# Patient Record
Sex: FEMALE | Race: WHITE | Employment: STUDENT | URBAN - METROPOLITAN AREA
[De-identification: names, ages, dates, MRNs, and addresses within clinical notes are randomized per-mention and may not be internally consistent; named-entity substitution may affect disease eponyms.]

---

## 2017-07-21 PROBLEM — K85.90 ACUTE PANCREATITIS WITHOUT INFECTION OR NECROSIS, UNSPECIFIED PANCREATITIS TYPE: Status: ACTIVE | Noted: 2017-07-21

## 2017-08-10 PROCEDURE — 87338 HPYLORI STOOL AG IA: CPT | Performed by: SPECIALIST

## 2019-04-23 ENCOUNTER — HOSPITAL ENCOUNTER (OUTPATIENT)
Age: 24
Discharge: HOME | End: 2019-04-23
Attending: FAMILY MEDICINE
Payer: COMMERCIAL

## 2019-04-23 VITALS
SYSTOLIC BLOOD PRESSURE: 125 MMHG | WEIGHT: 115 LBS | TEMPERATURE: 98 F | BODY MASS INDEX: 17.03 KG/M2 | DIASTOLIC BLOOD PRESSURE: 71 MMHG | OXYGEN SATURATION: 100 % | RESPIRATION RATE: 18 BRPM | HEART RATE: 75 BPM | HEIGHT: 69 IN

## 2019-04-23 DIAGNOSIS — Z20.2 STD EXPOSURE: Primary | ICD-10-CM

## 2019-04-23 PROCEDURE — 99204 OFFICE O/P NEW MOD 45 MIN: CPT

## 2019-04-23 PROCEDURE — 87491 CHLMYD TRACH DNA AMP PROBE: CPT | Performed by: FAMILY MEDICINE

## 2019-04-23 PROCEDURE — 87591 N.GONORRHOEAE DNA AMP PROB: CPT | Performed by: FAMILY MEDICINE

## 2019-04-23 PROCEDURE — 99202 OFFICE O/P NEW SF 15 MIN: CPT

## 2019-04-23 NOTE — ED PROVIDER NOTES
Pt seen in [facility] Immediate Care In [facility]      Stated Complaint: Patient presents with:  Eval-G (genital)      --------------   RN assessment:     Std exposure  --------------    CC: std exp    HPI:  Sergo Zamora is a 25year old fem Not on file        Relationship status: Not on file      Intimate partner violence:        Fear of current or ex partner: Not on file        Emotionally abused: Not on file        Physically abused: Not on file        Forced sexual activity: Not on file sounds active all four quadrants,     no masses, no organomegaly   Extremities:   Extremities normal, atraumatic, no cyanosis or edema   Pulses:   2+ and symmetric all extremities   Neurologic:   CNII-XII intact, normal strength, sensation and reflexes

## 2019-07-19 ENCOUNTER — TRANSFERRED RECORDS (OUTPATIENT)
Dept: HEALTH INFORMATION MANAGEMENT | Facility: CLINIC | Age: 24
End: 2019-07-19

## 2019-09-18 ENCOUNTER — HOSPITAL ENCOUNTER (OUTPATIENT)
Dept: MRI IMAGING | Facility: CLINIC | Age: 24
Discharge: HOME | End: 2019-09-18
Attending: INTERNAL MEDICINE | Admitting: INTERNAL MEDICINE
Payer: COMMERCIAL

## 2019-09-18 ENCOUNTER — TRANSFERRED RECORDS (OUTPATIENT)
Dept: HEALTH INFORMATION MANAGEMENT | Facility: CLINIC | Age: 24
End: 2019-09-18

## 2019-09-18 DIAGNOSIS — R11.0 NAUSEA: ICD-10-CM

## 2019-09-18 DIAGNOSIS — R10.12 LEFT UPPER QUADRANT PAIN: ICD-10-CM

## 2019-09-18 DIAGNOSIS — K31.84 GASTROPARESIS: ICD-10-CM

## 2019-09-18 PROCEDURE — 25000128 H RX IP 250 OP 636: Performed by: STUDENT IN AN ORGANIZED HEALTH CARE EDUCATION/TRAINING PROGRAM

## 2019-09-18 PROCEDURE — 72197 MRI PELVIS W/O & W/DYE: CPT

## 2019-09-18 PROCEDURE — A9585 GADOBUTROL INJECTION: HCPCS | Performed by: STUDENT IN AN ORGANIZED HEALTH CARE EDUCATION/TRAINING PROGRAM

## 2019-09-18 PROCEDURE — 25500064 ZZH RX 255 OP 636: Performed by: STUDENT IN AN ORGANIZED HEALTH CARE EDUCATION/TRAINING PROGRAM

## 2019-09-18 RX ORDER — GADOBUTROL 604.72 MG/ML
0.1 INJECTION INTRAVENOUS ONCE
Status: COMPLETED | OUTPATIENT
Start: 2019-09-18 | End: 2019-09-18

## 2019-09-18 RX ADMIN — GLUCAGON HYDROCHLORIDE 1 MG: 1 INJECTION, POWDER, FOR SOLUTION INTRAMUSCULAR; INTRAVENOUS; SUBCUTANEOUS at 13:37

## 2019-09-18 RX ADMIN — GADOBUTROL 7 ML: 604.72 INJECTION INTRAVENOUS at 14:05

## 2019-10-22 ENCOUNTER — MEDICAL CORRESPONDENCE (OUTPATIENT)
Dept: HEALTH INFORMATION MANAGEMENT | Facility: CLINIC | Age: 24
End: 2019-10-22

## 2019-10-22 ENCOUNTER — TRANSFERRED RECORDS (OUTPATIENT)
Dept: HEALTH INFORMATION MANAGEMENT | Facility: CLINIC | Age: 24
End: 2019-10-22

## 2019-10-22 ENCOUNTER — RECORDS - HEALTHEAST (OUTPATIENT)
Dept: ADMINISTRATIVE | Facility: OTHER | Age: 24
End: 2019-10-22

## 2019-11-06 NOTE — TELEPHONE ENCOUNTER
RECORDS RECEIVED FROM: Internal    DATE RECEIVED: 2/12/20   NOTES STATUS DETAILS   OFFICE NOTE from referring provider [facility] OV note 6/29/19   OFFICE NOTE from other specialist Received  [facility] recs scanned in epic    DISCHARGE SUMMARY from hospital N/A    OPERATIVE REPORT N/A    MEDICATION LIST CE Within OV note 6/29/19         ENDOSCOPY  N/A    COLONOSCOPY N/A    ERCP N/A    EUS N/A    STOOL TESTING N/A    PERTINENT LABS Received  Within [facility] notes   PATHOLOGY REPORTS (RELATED) N/A    IMAGING (CT, MRI, EGD) Internal MR Enterography 9/18/19 - IN  PACS      REFERRAL INFORMATION    Date referral was placed: 2/12/20   Date all records received: N/A   Date records were scanned into Epic: N/A   Date records were sent to Provider to review: N/A   Date and recommendation received from provider:  LETTER SENT  SCHEDULE APPOINTMENT   Date patient was contacted to schedule: 11/4/19

## 2019-11-07 ENCOUNTER — AMBULATORY - HEALTHEAST (OUTPATIENT)
Dept: SURGERY | Facility: CLINIC | Age: 24
End: 2019-11-07

## 2019-11-07 DIAGNOSIS — R10.9 ABDOMINAL PAIN: ICD-10-CM

## 2019-11-11 ENCOUNTER — OFFICE VISIT - HEALTHEAST (OUTPATIENT)
Dept: SURGERY | Facility: CLINIC | Age: 24
End: 2019-11-11

## 2019-11-11 DIAGNOSIS — R10.84 ABDOMINAL PAIN, GENERALIZED: ICD-10-CM

## 2019-11-12 ENCOUNTER — COMMUNICATION - HEALTHEAST (OUTPATIENT)
Dept: SURGERY | Facility: CLINIC | Age: 24
End: 2019-11-12

## 2019-11-14 ENCOUNTER — HOSPITAL ENCOUNTER (OUTPATIENT)
Dept: CT IMAGING | Facility: HOSPITAL | Age: 24
Discharge: HOME | End: 2019-11-14
Attending: SPECIALIST

## 2019-11-14 DIAGNOSIS — R10.84 ABDOMINAL PAIN, GENERALIZED: ICD-10-CM

## 2019-11-15 ENCOUNTER — AMBULATORY - HEALTHEAST (OUTPATIENT)
Dept: SURGERY | Facility: CLINIC | Age: 24
End: 2019-11-15

## 2019-11-15 DIAGNOSIS — R10.9 CHRONIC ABDOMINAL PAIN: ICD-10-CM

## 2019-11-15 DIAGNOSIS — G89.29 CHRONIC ABDOMINAL PAIN: ICD-10-CM

## 2019-11-21 ASSESSMENT — MIFFLIN-ST. JEOR: SCORE: 1303.34

## 2019-11-25 ENCOUNTER — SURGERY - HEALTHEAST (OUTPATIENT)
Dept: SURGERY | Facility: HOSPITAL | Age: 24
End: 2019-11-25

## 2019-11-25 ENCOUNTER — ANESTHESIA - HEALTHEAST (OUTPATIENT)
Dept: SURGERY | Facility: HOSPITAL | Age: 24
End: 2019-11-25

## 2020-02-12 ENCOUNTER — PRE VISIT (OUTPATIENT)
Dept: GASTROENTEROLOGY | Facility: CLINIC | Age: 25
End: 2020-02-12

## 2020-03-11 ENCOUNTER — HEALTH MAINTENANCE LETTER (OUTPATIENT)
Age: 25
End: 2020-03-11

## 2020-11-19 ENCOUNTER — TRANSFERRED RECORDS (OUTPATIENT)
Dept: HEALTH INFORMATION MANAGEMENT | Facility: CLINIC | Age: 25
End: 2020-11-19

## 2020-12-07 ENCOUNTER — TRANSFERRED RECORDS (OUTPATIENT)
Dept: HEALTH INFORMATION MANAGEMENT | Facility: CLINIC | Age: 25
End: 2020-12-07

## 2020-12-16 ENCOUNTER — OFFICE VISIT (OUTPATIENT)
Dept: FAMILY MEDICINE | Facility: CLINIC | Age: 25
End: 2020-12-16
Payer: OTHER GOVERNMENT

## 2020-12-16 VITALS
RESPIRATION RATE: 16 BRPM | SYSTOLIC BLOOD PRESSURE: 122 MMHG | HEIGHT: 69 IN | DIASTOLIC BLOOD PRESSURE: 62 MMHG | BODY MASS INDEX: 16.74 KG/M2 | WEIGHT: 113 LBS | OXYGEN SATURATION: 99 % | HEART RATE: 74 BPM | TEMPERATURE: 99.2 F

## 2020-12-16 DIAGNOSIS — F41.1 GENERALIZED ANXIETY DISORDER: Primary | ICD-10-CM

## 2020-12-16 DIAGNOSIS — R10.9 CHRONIC ABDOMINAL PAIN: ICD-10-CM

## 2020-12-16 DIAGNOSIS — G89.29 CHRONIC ABDOMINAL PAIN: ICD-10-CM

## 2020-12-16 PROCEDURE — 99204 OFFICE O/P NEW MOD 45 MIN: CPT | Performed by: INTERNAL MEDICINE

## 2020-12-16 RX ORDER — ONDANSETRON 4 MG/1
4 TABLET, FILM COATED ORAL PRN
COMMUNITY
Start: 2020-07-20

## 2020-12-16 RX ORDER — PAROXETINE 10 MG/1
20 TABLET, FILM COATED ORAL EVERY MORNING
Qty: 30 TABLET | Refills: 2 | Status: SHIPPED | OUTPATIENT
Start: 2020-12-16 | End: 2021-01-19

## 2020-12-16 RX ORDER — LORAZEPAM 0.5 MG/1
0.5 TABLET ORAL DAILY PRN
Qty: 10 TABLET | Refills: 0 | Status: SHIPPED | OUTPATIENT
Start: 2020-12-16

## 2020-12-16 SDOH — HEALTH STABILITY: MENTAL HEALTH: HOW MANY STANDARD DRINKS CONTAINING ALCOHOL DO YOU HAVE ON A TYPICAL DAY?: 1 OR 2

## 2020-12-16 SDOH — HEALTH STABILITY: MENTAL HEALTH: HOW OFTEN DO YOU HAVE 6 OR MORE DRINKS ON ONE OCCASION?: NEVER

## 2020-12-16 SDOH — HEALTH STABILITY: MENTAL HEALTH: HOW OFTEN DO YOU HAVE A DRINK CONTAINING ALCOHOL?: MONTHLY OR LESS

## 2020-12-16 ASSESSMENT — ANXIETY QUESTIONNAIRES
5. BEING SO RESTLESS THAT IT IS HARD TO SIT STILL: MORE THAN HALF THE DAYS
3. WORRYING TOO MUCH ABOUT DIFFERENT THINGS: NEARLY EVERY DAY
GAD7 TOTAL SCORE: 20
2. NOT BEING ABLE TO STOP OR CONTROL WORRYING: NEARLY EVERY DAY
7. FEELING AFRAID AS IF SOMETHING AWFUL MIGHT HAPPEN: NEARLY EVERY DAY
IF YOU CHECKED OFF ANY PROBLEMS ON THIS QUESTIONNAIRE, HOW DIFFICULT HAVE THESE PROBLEMS MADE IT FOR YOU TO DO YOUR WORK, TAKE CARE OF THINGS AT HOME, OR GET ALONG WITH OTHER PEOPLE: VERY DIFFICULT
1. FEELING NERVOUS, ANXIOUS, OR ON EDGE: NEARLY EVERY DAY
6. BECOMING EASILY ANNOYED OR IRRITABLE: NEARLY EVERY DAY

## 2020-12-16 ASSESSMENT — PATIENT HEALTH QUESTIONNAIRE - PHQ9
SUM OF ALL RESPONSES TO PHQ QUESTIONS 1-9: 12
5. POOR APPETITE OR OVEREATING: NEARLY EVERY DAY

## 2020-12-16 ASSESSMENT — MIFFLIN-ST. JEOR: SCORE: 1326.55
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
## 2020-12-16 NOTE — NURSING NOTE
PT declined giving info about [facility] today - she didn't remember the place and told me that she doesn't want the records to come.  [facility] signed today ([facility])  Flu shot declined

## 2020-12-16 NOTE — PATIENT INSTRUCTIONS
1. Will get information [facility]  2. Discuss family planning with GI doctor.  3. Start Paxil (paroxetine) 10 mg daily  4. Use Ativan (lorazepam) as needed for severe anxiety.  Watch for drowsiness

## 2020-12-17 ASSESSMENT — ANXIETY QUESTIONNAIRES: GAD7 TOTAL SCORE: 20

## 2021-01-03 ENCOUNTER — HEALTH MAINTENANCE LETTER (OUTPATIENT)
Age: 26
End: 2021-01-03

## 2021-01-19 ENCOUNTER — OFFICE VISIT (OUTPATIENT)
Dept: FAMILY MEDICINE | Facility: CLINIC | Age: 26
End: 2021-01-19
Payer: OTHER GOVERNMENT

## 2021-01-19 VITALS
BODY MASS INDEX: 16.99 KG/M2 | HEART RATE: 69 BPM | WEIGHT: 116 LBS | SYSTOLIC BLOOD PRESSURE: 116 MMHG | DIASTOLIC BLOOD PRESSURE: 66 MMHG | RESPIRATION RATE: 16 BRPM | OXYGEN SATURATION: 100 % | TEMPERATURE: 97.4 F

## 2021-01-19 DIAGNOSIS — Z12.4 SCREENING FOR MALIGNANT NEOPLASM OF CERVIX: ICD-10-CM

## 2021-01-19 DIAGNOSIS — T88.7XXA MEDICATION SIDE EFFECTS: ICD-10-CM

## 2021-01-19 DIAGNOSIS — Z23 NEED FOR VACCINATION: ICD-10-CM

## 2021-01-19 DIAGNOSIS — F41.1 GENERALIZED ANXIETY DISORDER: Primary | ICD-10-CM

## 2021-01-19 PROCEDURE — G0145 SCR C/V CYTO,THINLAYER,RESCR: HCPCS | Performed by: INTERNAL MEDICINE

## 2021-01-19 PROCEDURE — G0124 SCREEN C/V THIN LAYER BY MD: HCPCS | Performed by: PATHOLOGY

## 2021-01-19 PROCEDURE — 90472 IMMUNIZATION ADMIN EACH ADD: CPT | Performed by: INTERNAL MEDICINE

## 2021-01-19 PROCEDURE — 90715 TDAP VACCINE 7 YRS/> IM: CPT | Performed by: INTERNAL MEDICINE

## 2021-01-19 PROCEDURE — 90651 9VHPV VACCINE 2/3 DOSE IM: CPT | Performed by: INTERNAL MEDICINE

## 2021-01-19 PROCEDURE — 90471 IMMUNIZATION ADMIN: CPT | Performed by: INTERNAL MEDICINE

## 2021-01-19 PROCEDURE — 99214 OFFICE O/P EST MOD 30 MIN: CPT | Mod: 25 | Performed by: INTERNAL MEDICINE

## 2021-01-19 RX ORDER — PAROXETINE 20 MG/1
20 TABLET, FILM COATED ORAL EVERY MORNING
Qty: 90 TABLET | Refills: 3 | Status: SHIPPED | OUTPATIENT
Start: 2021-01-19 | End: 2021-01-27 | Stop reason: SINTOL

## 2021-01-19 ASSESSMENT — ANXIETY QUESTIONNAIRES
1. FEELING NERVOUS, ANXIOUS, OR ON EDGE: SEVERAL DAYS
6. BECOMING EASILY ANNOYED OR IRRITABLE: NOT AT ALL
7. FEELING AFRAID AS IF SOMETHING AWFUL MIGHT HAPPEN: SEVERAL DAYS
IF YOU CHECKED OFF ANY PROBLEMS ON THIS QUESTIONNAIRE, HOW DIFFICULT HAVE THESE PROBLEMS MADE IT FOR YOU TO DO YOUR WORK, TAKE CARE OF THINGS AT HOME, OR GET ALONG WITH OTHER PEOPLE: SOMEWHAT DIFFICULT
2. NOT BEING ABLE TO STOP OR CONTROL WORRYING: SEVERAL DAYS
5. BEING SO RESTLESS THAT IT IS HARD TO SIT STILL: SEVERAL DAYS
3. WORRYING TOO MUCH ABOUT DIFFERENT THINGS: SEVERAL DAYS

## 2021-01-19 ASSESSMENT — PATIENT HEALTH QUESTIONNAIRE - PHQ9: SUM OF ALL RESPONSES TO PHQ QUESTIONS 1-9: 5

## 2021-01-19 NOTE — PROGRESS NOTES
Assessment & Plan     Generalized anxiety disorder - patient opts to continue med. Prefers to manage insomnia with over the counter and non-Rx therapy. Discussed general recommendation to use alternative med in pregnancy . Patient not planning pregnancy yet.  Discussed trialing another med vs OB/GYN consultation prior to pregnancy, patient agreeable.    - PARoxetine (PAXIL) 20 MG tablet; Take 1 tablet (20 mg) by mouth every morning    Medication side effects - insomnia.    Screening for malignant neoplasm of cervix  - Pap imaged thin layer screen only - recommended age 21 - 24 years    Need for vaccination  - TDAP VACCINE (Adacel, Boostrix)  [[id number]]  - HUMAN PAPILLOMA VIRUS (GARDASIL 9) VACCINE [[id number]]                         Patient Instructions   1. We discussed insomnia as likely side effects of the paroxetine.  Can try PM medication such as Benadryl 25-50 mg.  Watch for dry mouth.  Can also try to move the paroxetine to bedtime.  2. Refilled paxil at 20 mg        Sleep Hygiene    1. Avoid doing anything stressful in the hour before bedtime. Avoid paying bills, watching politics on the news, etc.  2. Avoid screens just before bedtime. This includes cell phones, iPad, computers, TV. The bright lights on the screen is very stimulating to the brain, and makes it difficult to follow asleep and stay asleep  3. Avoid alcohol. Alcohol can sometimes make it easier to fall asleep, but significantly reduces the chance that you will stay asleep. It also impairs REM sleep, which is the good restful sleep  4. Use the bed for sleep and sex only. Avoid eating, reading, watching TV in bed  5. If you feel very restless at bedtime, try doing mundane physical activities such as vacuuming, folding laundry, etc.  6. If you find yourself making lists in your head at night, keep pen and paper at the bedside. Write down these lists. Also visualize yourself checking that item off your list and removing it from your brain.  7. Keep  the room cool and dark. Consider investing in late darkening curtains  8. Avoid drinking excessive fluids just before bedtime. Empty your bladder just before bedtime  9. Cognitive behavioral therapy (CBT) has been proven to be highly effective to treat insomnia.  Consider meeting with [facility] [place] for CBT.  There is also a free precious called CBT-I               No follow-ups on file.    Amanda Greenberg,   [facility]    Rajiv Mohan is a 25 year old who presents to clinic today for the following health issues     HPI     Flu shot: Declined  HPV:  Today  Tdap: Today    PAP today      Depression and Anxiety Follow-Up    How are you doing with your depression since your last visit? Improved     How are you doing with your anxiety since your last visit?  Improved     Are you having other symptoms that might be associated with depression or anxiety? No    Have you had a significant life event? No     Do you have any concerns with your use of alcohol or other drugs? No     Last visit on 12/16 we started paxil and ativan.  She feels they are helping significantly.  Appetite is improving, not feeling as stressed about her GI symptoms. Appetite is increased which is good.    side effects is insomnia and feeling restless.  This feels tolerable because so many other symptoms are improved.  Is taking in AM.    Has tried melatonin but made her too groggy and did not last all night    She strongly prefers to continue on paxil and manage insomnia.    Having mild RLS which have occurred even prior to starting paxil    Doing mediation at bedtime.    Has not needed to use the ativan.    Wonders if she can take this if she would get pregnant.    Social History     Tobacco Use     Smoking status: Never Smoker     Smokeless tobacco: Never Used   Substance Use Topics     Alcohol use: Yes     Frequency: Monthly or less     Drinks per session: 1 or 2     Binge  frequency: Never     Comment: Sometimes     Drug use: Never     PHQ 12/16/2020   PHQ-9 Total Score 12   Q9: Thoughts of better off dead/self-harm past 2 weeks Not at all     JERSON-7 SCORE 12/16/2020   Total Score 20     Last PHQ-9 12/16/2020   1.  Little interest or pleasure in doing things 1   2.  Feeling down, depressed, or hopeless 1   3.  Trouble falling or staying asleep, or sleeping too much 2   4.  Feeling tired or having little energy 2   5.  Poor appetite or overeating 2   6.  Feeling bad about yourself 2   7.  Trouble concentrating 2   8.  Moving slowly or restless 0   Q9: Thoughts of better off dead/self-harm past 2 weeks 0   PHQ-9 Total Score 12   Difficulty at work, home, or with people Somewhat difficult     JERSON-7  12/16/2020   1. Feeling nervous, anxious, or on edge 3   2. Not being able to stop or control worrying 3   3. Worrying too much about different things 3   4. Trouble relaxing 3   5. Being so restless that it is hard to sit still 2   6. Becoming easily annoyed or irritable 3   7. Feeling afraid, as if something awful might happen 3   JERSON-7 Total Score 20   If you checked any problems, how difficult have they made it for you to do your work, take care of things at home, or get along with other people? Very difficult       Suicide Assessment Five-step Evaluation and Treatment (SAFE-T)      Review of Systems   Constitutional, HEENT, cardiovascular, pulmonary, gi and gu systems are negative, except as otherwise noted.      Objective    /66   Pulse 69   Temp 97.4  F (36.3  C) (Tympanic)   Resp 16   Wt 52.6 kg (116 lb)   LMP 01/07/2021   SpO2 100%   Breastfeeding No   BMI 16.99 kg/m    Body mass index is 16.99 kg/m .  Physical Exam   GENERAL APPEARANCE: healthy, alert and no distress   (female): normal cervix, adnexae, and uterus without masses or discharge and pap collected  PSYCH: mentation appears normal and affect normal/bright

## 2021-01-19 NOTE — PATIENT INSTRUCTIONS
1. We discussed insomnia as likely side effects of the paroxetine.  Can try PM medication such as Benadryl 25-50 mg.  Watch for dry mouth.  Can also try to move the paroxetine to bedtime.  2. Refilled paxil at 20 mg        Sleep Hygiene    1. Avoid doing anything stressful in the hour before bedtime. Avoid paying bills, watching politics on the news, etc.  2. Avoid screens just before bedtime. This includes cell phones, iPad, computers, TV. The bright lights on the screen is very stimulating to the brain, and makes it difficult to follow asleep and stay asleep  3. Avoid alcohol. Alcohol can sometimes make it easier to fall asleep, but significantly reduces the chance that you will stay asleep. It also impairs REM sleep, which is the good restful sleep  4. Use the bed for sleep and sex only. Avoid eating, reading, watching TV in bed  5. If you feel very restless at bedtime, try doing mundane physical activities such as vacuuming, folding laundry, etc.  6. If you find yourself making lists in your head at night, keep pen and paper at the bedside. Write down these lists. Also visualize yourself checking that item off your list and removing it from your brain.  7. Keep the room cool and dark. Consider investing in late darkening curtains  8. Avoid drinking excessive fluids just before bedtime. Empty your bladder just before bedtime  9. Cognitive behavioral therapy (CBT) has been proven to be highly effective to treat insomnia.  Consider meeting with [facility] [place] for CBT.  There is also a free precious called CBT-I

## 2021-01-19 NOTE — NURSING NOTE
Prior to immunization administration, verified patients identity using patient s name and date of birth. Please see Immunization Activity for additional information.     Screening Questionnaire for Adult Immunization    Are you sick today?   No   Do you have allergies to medications, food, a vaccine component or latex?   No   Have you ever had a serious reaction after receiving a vaccination?   No   Do you have a long-term health problem with heart, lung, kidney, or metabolic disease (e.g., diabetes), asthma, a blood disorder, no spleen, complement component deficiency, a cochlear implant, or a spinal fluid leak?  Are you on long-term aspirin therapy?   No   Do you have cancer, leukemia, HIV/AIDS, or any other immune system problem?   No   Do you have a parent, brother, or sister with an immune system problem?   No   In the past 3 months, have you taken medications that affect  your immune system, such as prednisone, other steroids, or anticancer drugs; drugs for the treatment of rheumatoid arthritis, Crohn s disease, or psoriasis; or have you had radiation treatments?   No   Have you had a seizure, or a brain or other nervous system problem?   No   During the past year, have you received a transfusion of blood or blood    products, or been given immune (gamma) globulin or antiviral drug?   No   For women: Are you pregnant or is there a chance you could become       pregnant during the next month?   No   Have you received any vaccinations in the past 4 weeks?   No     Immunization questionnaire answers were all negative.        Per orders of Dr. Greenberg, injection of Tdap + HPV given by Nelli Almazan CMA. Patient instructed to remain in clinic for 15 minutes afterwards, and to report any adverse reaction to me immediately.       Screening performed by Nelli Almazan CMA on 1/19/2021 at 9:28 AM.

## 2021-01-21 LAB
COPATH REPORT: ABNORMAL
PAP: ABNORMAL

## 2021-01-22 ENCOUNTER — PATIENT OUTREACH (OUTPATIENT)
Dept: INTERNAL MEDICINE | Facility: CLINIC | Age: 26
End: 2021-01-22

## 2021-01-22 DIAGNOSIS — R87.612 PAPANICOLAOU SMEAR OF CERVIX WITH LOW GRADE SQUAMOUS INTRAEPITHELIAL LESION (LGSIL): ICD-10-CM

## 2021-01-26 ENCOUNTER — TELEPHONE (OUTPATIENT)
Dept: OBGYN | Facility: CLINIC | Age: 26
End: 2021-01-26

## 2021-01-26 NOTE — TELEPHONE ENCOUNTER
[facility] Call Center    Phone Message    May a detailed message be left on voicemail: yes     Reason for Call: Pt said that her PCP told her to call us and schedule a colposcopy.  Please call Pt to schedule.  Thanks.    Action Taken: Message routed to:  [facility] p [phone]    Travel Screening: Not Applicable

## 2021-01-27 ENCOUNTER — MYC MEDICAL ADVICE (OUTPATIENT)
Dept: FAMILY MEDICINE | Facility: CLINIC | Age: 26
End: 2021-01-27

## 2021-01-27 DIAGNOSIS — F41.1 GENERALIZED ANXIETY DISORDER: Primary | ICD-10-CM

## 2021-01-27 RX ORDER — ESCITALOPRAM OXALATE 10 MG/1
10 TABLET ORAL DAILY
Qty: 90 TABLET | Refills: 3 | Status: SHIPPED | OUTPATIENT
Start: 2021-01-27 | End: 2021-02-15

## 2021-01-27 NOTE — TELEPHONE ENCOUNTER
Dr. Greenberg,    Patient sends my chart message with experiencing insomnia from Paxil.  She would like to try the lexapro again.  Please advise. Amanda WALKER RN

## 2021-02-15 ENCOUNTER — MYC MEDICAL ADVICE (OUTPATIENT)
Dept: FAMILY MEDICINE | Facility: CLINIC | Age: 26
End: 2021-02-15

## 2021-02-15 DIAGNOSIS — F41.1 GENERALIZED ANXIETY DISORDER: Primary | ICD-10-CM

## 2021-02-15 RX ORDER — PAROXETINE 20 MG/1
20 TABLET, FILM COATED ORAL EVERY MORNING
Qty: 90 TABLET | Refills: 3 | Status: SHIPPED | OUTPATIENT
Start: 2021-02-15 | End: 2021-05-25

## 2021-02-15 NOTE — TELEPHONE ENCOUNTER
Routed to Dr Greenberg.  Please see pt [employer] message.  She is updating regarding her antidepressant.    Daysi Casiano RN

## 2021-02-16 ENCOUNTER — OFFICE VISIT (OUTPATIENT)
Dept: OBGYN | Facility: CLINIC | Age: 26
End: 2021-02-16
Payer: OTHER GOVERNMENT

## 2021-02-16 VITALS
DIASTOLIC BLOOD PRESSURE: 67 MMHG | HEART RATE: 81 BPM | WEIGHT: 121.6 LBS | SYSTOLIC BLOOD PRESSURE: 105 MMHG | BODY MASS INDEX: 17.81 KG/M2

## 2021-02-16 DIAGNOSIS — R87.612 PAPANICOLAOU SMEAR OF CERVIX WITH LOW GRADE SQUAMOUS INTRAEPITHELIAL LESION (LGSIL): ICD-10-CM

## 2021-02-16 DIAGNOSIS — Z32.00 PREGNANCY EXAMINATION OR TEST, PREGNANCY UNCONFIRMED: Primary | ICD-10-CM

## 2021-02-16 LAB — HCG UR QL: NEGATIVE

## 2021-02-16 PROCEDURE — 81025 URINE PREGNANCY TEST: CPT | Performed by: OBSTETRICS & GYNECOLOGY

## 2021-02-16 PROCEDURE — 88305 TISSUE EXAM BY PATHOLOGIST: CPT | Performed by: PATHOLOGY

## 2021-02-16 PROCEDURE — 57454 BX/CURETT OF CERVIX W/SCOPE: CPT | Performed by: OBSTETRICS & GYNECOLOGY

## 2021-02-16 PROCEDURE — 99243 OFF/OP CNSLTJ NEW/EST LOW 30: CPT | Mod: 25 | Performed by: OBSTETRICS & GYNECOLOGY
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
## 2021-02-16 NOTE — PATIENT INSTRUCTIONS
If you have any questions regarding your visit, Please contact your care team.  [facility]: [phone]  Women s Health CLINIC HOURS TELEPHONE NUMBER   Cephas Agbeh, M.D. Becky-RN Kylie-RN Heidi-RN Deanna-MA Angela-  Natali-         Monday-[place]    8:00a.m-4:45 p.m    Tuesday--[place]     8:00a.m-4:45 p.m.    Thursday-[place]    8:00a.m-4:45 p.m.    Friday-[place]    8:00a.m-4:45 p.m    [facility]   [address]   [place] [address]   [phone]   Fax [phone]   [id number]-[phone]     [facility] Labor and Delivery   [address] [place] [address] [place] [address]   [phone]    [facility]  [address] [facility] [address]  [phone]  Pevntxm-[phone]   Urgent Care locations:    [facility] Monday-Friday  5 pm - 9 pm  Saturday and Sunday   9 am - 5 pm   Monday-Friday   5 pm - 9 pm  Saturday and Sunday  9 am - 5 pm    [phone]   If you need a medication refill, please contact your pharmacy. Please allow 3 business days for your refill to be completed.  As always, Thank you for trusting us with your healthcare needs!

## 2021-02-23 ENCOUNTER — MYC MEDICAL ADVICE (OUTPATIENT)
Dept: OBGYN | Facility: CLINIC | Age: 26
End: 2021-02-23

## 2021-02-23 LAB — COPATH REPORT: NORMAL

## 2021-02-23 NOTE — TELEPHONE ENCOUNTER
Will route pt's message to Dr. Agbeh as it looks like he attempted to reach her based off her 2/16 surgical pathology result note.    Rita Levy, RN    I spoke with the patient about AMISH 2 colploscopy/biosy pathology results. Due to concerns regarding future pregnancies,I recommend observation with repeat pap(Co-Test) at 6 and 12 months .This plan is acceptable to Ms. Gabriel.  CEPHAS AGBEH, MD.

## 2021-02-24 ENCOUNTER — PATIENT OUTREACH (OUTPATIENT)
Dept: OBGYN | Facility: CLINIC | Age: 26
End: 2021-02-24

## 2021-02-24 DIAGNOSIS — R87.612 PAPANICOLAOU SMEAR OF CERVIX WITH LOW GRADE SQUAMOUS INTRAEPITHELIAL LESION (LGSIL): ICD-10-CM

## 2021-02-24 NOTE — TELEPHONE ENCOUNTER
Agbeh, Cephas Mawuena, MD  You 14 minutes ago (10:11 AM)     I spoke with the patient about AMISH 2 colploscopy/biosy pathology results. Due to concerns regarding future pregnancies,I recommend observation with repeat pap(Co-Test) at 6 and 12 months .This plan is acceptable to Ms. Gabriel.   CEPHAS AGBEH, MD.

## 2021-04-25 ENCOUNTER — HEALTH MAINTENANCE LETTER (OUTPATIENT)
Age: 26
End: 2021-04-25

## 2021-05-18 NOTE — PROGRESS NOTES
Cristofer is a 26 year old who is being evaluated via a billable video visit.      How would you like to obtain your AVS? MyChart  If the video visit is dropped, the invitation should be resent by: Text to cell phone: [phone]  Will anyone else be joining your video visit? No      Video Start Time: 7:19 AM    Assessment & Plan   Problem List Items Addressed This Visit     Generalized anxiety disorder    Relevant Medications    PARoxetine (PAXIL) 10 MG tablet                    Depression Screening Follow Up    PHQ 5/25/2021   PHQ-9 Total Score 10   Q9: Thoughts of better off dead/self-harm past 2 weeks Not at all     Last PHQ-9 5/25/2021   1.  Little interest or pleasure in doing things 2   2.  Feeling down, depressed, or hopeless 2   3.  Trouble falling or staying asleep, or sleeping too much 2   4.  Feeling tired or having little energy 2   5.  Poor appetite or overeating 0   6.  Feeling bad about yourself 0   7.  Trouble concentrating 2   8.  Moving slowly or restless 0   Q9: Thoughts of better off dead/self-harm past 2 weeks 0   PHQ-9 Total Score 10   Difficulty at work, home, or with people Somewhat difficult       Follow Up Actions Taken  Crisis resource information provided in After Visit Summary  Referred patient back to current mental health provider.     Patient Instructions   1. Refill the paxil at 10 mg  2. When you are ready for pregnancy, we may decide to start a different medication, consider one time consult with psychiatry or meet with OB/GYN      No follow-ups on file.    Amanda Greenberg, [facility]    Subjective   Cristofer is a 26 year old who presents for the following health issues     HPI     Anxiety Follow-Up  Pt states that she would like to start anxiety medication again, but would like to discusss other options than the Paxil. Has not yet taken the Ativan.    How are you doing with your anxiety since your last visit? Worsened     Are you having other symptoms  that might be associated with anxiety? No    Have you had a significant life event? No     Are you feeling depressed? Yes:  .    Do you have any concerns with your use of alcohol or other drugs? No     Thinking about pregnancy - waiting 1-2 years    side effects of paxil 20 mg - insomnia.  Did not have same side effects on 10 mg    lexapro - drowsiness and not helpful, effexor - not helpful and had prolonged withdrawal    On paxil 10 mg has mild RLS that is tolerable.  And felt it helped anxiety    My chart message 2/15   - I tried Lexapro for a while and it seemed completely ineffective and was making me sleep a lot. I weened back onto Paxil over this last week, starting at 10 before going up to 20 and that has seemed to help me tolerate it better    My chart message 1/27  I'm not sure if I'm able to continue with Paxil as not sleeping is becoming really tough. I did try switching it to nighttime but it doesn't seem like it's making a difference. I'm thinking about trying Lexapro since I know it's worked before.        Social History     Tobacco Use     Smoking status: Never Smoker     Smokeless tobacco: Never Used   Substance Use Topics     Alcohol use: Yes     Frequency: Monthly or less     Drinks per session: 1 or 2     Binge frequency: Never     Comment: Sometimes     Drug use: Never     JERSON-7 SCORE 12/16/2020 5/25/2021   Total Score 20 15     PHQ 12/16/2020 1/19/2021 5/25/2021   PHQ-9 Total Score 12 5 10   Q9: Thoughts of better off dead/self-harm past 2 weeks Not at all Not at all Not at all     Last PHQ-9 5/25/2021   1.  Little interest or pleasure in doing things 2   2.  Feeling down, depressed, or hopeless 2   3.  Trouble falling or staying asleep, or sleeping too much 2   4.  Feeling tired or having little energy 2   5.  Poor appetite or overeating 0   6.  Feeling bad about yourself 0   7.  Trouble concentrating 2   8.  Moving slowly or restless 0   Q9: Thoughts of better off dead/self-harm past 2 weeks 0    PHQ-9 Total Score 10   Difficulty at work, home, or with people Somewhat difficult     JERSON-7  5/25/2021   1. Feeling nervous, anxious, or on edge 3   2. Not being able to stop or control worrying 2   3. Worrying too much about different things 2   4. Trouble relaxing 2   5. Being so restless that it is hard to sit still 2   6. Becoming easily annoyed or irritable 2   7. Feeling afraid, as if something awful might happen 2   JERSON-7 Total Score 15   If you checked any problems, how difficult have they made it for you to do your work, take care of things at home, or get along with other people? Somewhat difficult             Review of Systems   Constitutional, HEENT, cardiovascular, pulmonary, gi and gu systems are negative, except as otherwise noted.      Objective           Vitals:  No vitals were obtained today due to virtual visit.    Physical Exam   GENERAL: Healthy, alert and no distress  EYES: Eyes grossly normal to inspection.  No discharge or erythema, or obvious scleral/conjunctival abnormalities.  RESP: No audible wheeze, cough, or visible cyanosis.  No visible retractions or increased work of breathing.    SKIN: Visible skin clear. No significant rash, abnormal pigmentation or lesions.  NEURO: Cranial nerves grossly intact.  Mentation and speech appropriate for age.  PSYCH: Mentation appears normal, affect normal/bright, judgement and insight intact, normal speech and appearance well-groomed.                Video-Visit Details    Type of service:  Video Visit    Video End Time:7:35 AM    Originating Location (pt. Location): Home    Distant Location (provider location):  [facility]     Platform used for Video Visit: Snapkin

## 2021-05-25 ENCOUNTER — VIRTUAL VISIT (OUTPATIENT)
Dept: FAMILY MEDICINE | Facility: CLINIC | Age: 26
End: 2021-05-25

## 2021-05-25 DIAGNOSIS — F41.1 GENERALIZED ANXIETY DISORDER: ICD-10-CM

## 2021-05-25 PROCEDURE — 99213 OFFICE O/P EST LOW 20 MIN: CPT | Mod: 95 | Performed by: INTERNAL MEDICINE

## 2021-05-25 RX ORDER — PAROXETINE 10 MG/1
10 TABLET, FILM COATED ORAL EVERY MORNING
Qty: 90 TABLET | Refills: 3 | Status: SHIPPED | OUTPATIENT
Start: 2021-05-25 | End: 2022-05-20

## 2021-05-25 ASSESSMENT — ANXIETY QUESTIONNAIRES
1. FEELING NERVOUS, ANXIOUS, OR ON EDGE: NEARLY EVERY DAY
3. WORRYING TOO MUCH ABOUT DIFFERENT THINGS: MORE THAN HALF THE DAYS
7. FEELING AFRAID AS IF SOMETHING AWFUL MIGHT HAPPEN: MORE THAN HALF THE DAYS
2. NOT BEING ABLE TO STOP OR CONTROL WORRYING: MORE THAN HALF THE DAYS
GAD7 TOTAL SCORE: 15
5. BEING SO RESTLESS THAT IT IS HARD TO SIT STILL: MORE THAN HALF THE DAYS
6. BECOMING EASILY ANNOYED OR IRRITABLE: MORE THAN HALF THE DAYS
IF YOU CHECKED OFF ANY PROBLEMS ON THIS QUESTIONNAIRE, HOW DIFFICULT HAVE THESE PROBLEMS MADE IT FOR YOU TO DO YOUR WORK, TAKE CARE OF THINGS AT HOME, OR GET ALONG WITH OTHER PEOPLE: SOMEWHAT DIFFICULT

## 2021-05-25 ASSESSMENT — PATIENT HEALTH QUESTIONNAIRE - PHQ9
SUM OF ALL RESPONSES TO PHQ QUESTIONS 1-9: 10
5. POOR APPETITE OR OVEREATING: MORE THAN HALF THE DAYS

## 2021-05-25 NOTE — PATIENT INSTRUCTIONS
1. Refill the paxil at 10 mg  2. When you are ready for pregnancy, we may decide to start a different medication, consider one time consult with psychiatry or meet with OB/GYN

## 2021-05-26 ASSESSMENT — ANXIETY QUESTIONNAIRES: GAD7 TOTAL SCORE: 15

## 2021-06-03 VITALS
OXYGEN SATURATION: 100 % | HEART RATE: 63 BPM | SYSTOLIC BLOOD PRESSURE: 114 MMHG | DIASTOLIC BLOOD PRESSURE: 56 MMHG | WEIGHT: 112 LBS

## 2021-06-03 NOTE — TELEPHONE ENCOUNTER
Spoke with patient this morning re: CT scan.  Patient scheduled appointment on her own for 11/14/2019.      Jyotsna Euceda  [facility]  General Surgery  P: [phone]  F: [phone]

## 2021-06-03 NOTE — ANESTHESIA CARE TRANSFER NOTE
Last vitals:   Vitals:    11/25/19 1508   BP: 111/66   Pulse: 75   Resp: 14   Temp: 36.4  C (97.6  F)   SpO2: 100%     Patient's level of consciousness is drowsy  Spontaneous respirations: yes  Maintains airway independently: yes  Dentition unchanged: yes  Oropharynx: oropharynx clear of all foreign objects    QCDR Measures:  ASA# 20 - Surgical Safety Checklist: WHO surgical safety checklist completed prior to induction    PQRS# 430 - Adult PONV Prevention: 4558F - Pt received => 2 anti-emetic agents (different classes) preop & intraop  ASA# 8 - Peds PONV Prevention: NA - Not pediatric patient, not GA or 2 or more risk factors NOT present  PQRS# 424 - Theodora-op Temp Management: 4559F - At least one body temp DOCUMENTED => 35.5C or 95.9F within required timeframe  PQRS# 426 - PACU Transfer Protocol: - Transfer of care checklist used  ASA# 14 - Acute Post-op Pain: ASA14B - Patient did NOT experience pain >= 7 out of 10

## 2021-06-03 NOTE — ANESTHESIA POSTPROCEDURE EVALUATION
Patient: Kimberlee Gabriel  LAPAROSCOPY, LYSIS OF ADHESIONS  Anesthesia type: general    Patient location: PACU  Last vitals:   Vitals Value Taken Time   /74 11/25/2019  3:40 PM   Temp 36.8  C (98.2  F) 11/25/2019  3:40 PM   Pulse 65 11/25/2019  3:45 PM   Resp 17 11/25/2019  3:45 PM   SpO2 100 % 11/25/2019  3:45 PM   Vitals shown include unvalidated device data.  Post vital signs: stable  Level of consciousness: awake and responds to simple questions  Post-anesthesia pain: pain controlled  Post-anesthesia nausea and vomiting: no  Pulmonary: unassisted, return to baseline  Cardiovascular: stable and blood pressure at baseline  Hydration: adequate  Anesthetic events: no    QCDR Measures:  ASA# 11 - Theodora-op Cardiac Arrest: ASA11B - Patient did NOT experience unanticipated cardiac arrest  ASA# 12 - Theodora-op Mortality Rate: ASA12B - Patient did NOT die  ASA# 13 - PACU Re-Intubation Rate: ASA13B - Patient did NOT require a new airway mgmt  ASA# 10 - Composite Anes Safety: ASA10A - No serious adverse event    Additional Notes:

## 2021-06-03 NOTE — ANESTHESIA PREPROCEDURE EVALUATION
Anesthesia Evaluation      Patient summary reviewed   History of anesthetic complications (PONV)     Airway   Mallampati: II  Neck ROM: full   Pulmonary - negative ROS    breath sounds clear to auscultation                         Cardiovascular - negative ROS  Rhythm: regular  Rate: normal,         Neuro/Psych - negative ROS     Endo/Other - negative ROS      GI/Hepatic/Renal      Comments: Chronic abdominal pain          Dental - normal exam                        Anesthesia Plan  Planned anesthetic: general endotracheal  Propofol infusion ~100 mcg/kg/min w/ ~0.5 MAC inhaled agent  Toradol 15 mg pre-emergence if ok with surgeon    ASA 2   Induction: intravenous   Anesthetic plan and risks discussed with: patient and parent/guardian  Anesthesia plan special considerations: antiemetics,   Post-op plan: routine recovery

## 2021-06-03 NOTE — PROGRESS NOTES
[facility] Consult    HPI:    24 y.o. year old female who I have been consulted by Provider, No Primary Care for evaluation of Consult (Abdominal pain- MRI at [facility] )   by Dr. Ferrer.  She has had abdominal pain for years.  She feels this stems from a operation she had secondary to accident where she says she had bowel injury and bowel repair.  She describes her problems now is that she eats and she has pain in the pain and nausea.  She worked up extensively in [place] and here in the [place] by multiple providers and multiple GI doctors.  No source of her pain and pressure is been found.  The MRI enterography was just recently done which by report I have tests such as a gastric emptying leak which shows possibly some gastroparesis has also been positive.  Endoscopies have been negative and other studies have also been negative including MR enterography.  She discretely describes this as I eaten I have pain and I have the symptoms and issues immediately after eating.  She states that she is able to not vomit but has severe nausea.  She is had weight loss but has a weight gain she was down to 103 she is back up to 112 today.   I was asked by Dr. Rosario to evaluate her for possible surgical intervention.    Allergies:Patient has no known allergies.    History reviewed. No pertinent past medical history.    Past Surgical History:   Procedure Laterality Date     SMALL INTESTINE SURGERY  2002     TONSILLECTOMY AND ADENOIDECTOMY         CURRENT MEDS:  Current Outpatient Medications on File Prior to Visit   Medication Sig Dispense Refill     ALPRAZolam (XANAX) 0.25 MG tablet Take 0.25 mg by mouth.       cyproheptadine (PERIACTIN) 4 mg tablet Take 4 mg by mouth.       LACTOBACILLUS ACIDOPHILUS ORAL Take 1 capsule by mouth.       ondansetron (ZOFRAN-ODT) 4 MG disintegrating tablet Take 4 mg by mouth.       No current facility-administered medications on file prior to visit.        History reviewed. No  pertinent family history.     reports that she has never smoked. She has never used smokeless tobacco. She reports current alcohol use. She reports that she does not use drugs.    Review of Systems:  Negative except abdominal pain nausea chronic and debilitating happens on a daily basis.  He has weight fluctuations secondary to it and severe symptoms of cause her so much trouble she like surgery for this possible to repair this.  If there is her surgical option.    OBJECTIVE:  Vitals:    11/11/19 1020   BP: 114/56   Patient Site: Right Arm   Patient Position: Sitting   Cuff Size: Adult Small   Pulse: 63   SpO2: 100%   Weight: 112 lb (50.8 kg)     There is no height or weight on file to calculate BMI.    EXAM:  GENERAL: This is a well-developed 24 y.o. female who appears her stated age  HEAD: normocephalic  HEENT: Pupils equal and reactive bilaterally  MOUTH: mucus membranes intact  CARDIAC: RRR without murmur  CHEST/LUNG:  Clear to auscultation  ABDOMEN: Soft, nontender, nondistended, no masses  Incision around her umbilicus both above and below.  No herniations are noted.  EXTREMITIES: Grossly normal, warm,   NEUROLOGIC: Focally intact, nonfocal  INTEGUMENT: No open lesions or ulcers  VASCULAR: Pulses intact, symmetrical upper and lower extremities.        LABS:  No results found for: WBC, HGB, HCT, MCV, PLT  INR/Prothrombin Time      No results found for: HGBA1C  No results found for: ALT, AST, GGT, ALKPHOS, BILITOT     Images: Reviewed the MR enterography from the [facility] which is normal      Assessment/Plan:   She has chronic abdominal pain and nausea.  She associates this this with eating.  Her work-up is been endoscopy enterography by MRI gastric emptying study which was kind of positive for gastroparesis but kind of not also multiple GI providers have done multiple work-ups both here in the [facility] and in [place].  After evaluating her studies and discussing things with her I would like to  rule out SMA syndrome first.  We did a CTA of her abdomen look at the angle of her SMA takeoff also look and see if she has dilatated duodenum proximal none of the studies described that and she probably does not but I want to rule that out.  NG feeds and weight gain conservative management for at least 6 to 8 weeks.  If this is negative at that time point I think her options are from my standpoint I could do a laparoscopy I do not think that the yield should be very high to be less than 2% risk of potential finding something may be amenable to making things better for her and/or exploration if need be.  This was discussed with her and we will order a CT scan of her abdomen with a CTA and follow-up with her once we get that study done.    No follow-ups on file.     Kota Reynoso MD  [facility] Department of Surgery

## 2021-06-04 VITALS — HEIGHT: 69 IN | WEIGHT: 110 LBS | BODY MASS INDEX: 16.29 KG/M2

## 2021-06-19 NOTE — LETTER
Letter by Kota Reynoso MD at      Author: Kota Reynoso MD Service: -- Author Type: --    Filed:  Encounter Date: 11/11/2019 Status: Signed         Satnam Ferrer MD  [address] [place] [address]                                  November 11, 2019    Patient: Kimberlee Gabriel   MR Number: [id number]   YOB: 1995   Date of Visit: 11/11/2019     Dear Dr. Carissa MD:    Thank you for referring Kimberlee Gabriel to me for evaluation. Below are the relevant portions of my assessment and plan of care.    If you have questions, please do not hesitate to call me. I look forward to following Kimberlee along with you.    Sincerely,        Kota Reynoso MD          CC  No Recipients  Kota Reynoso MD  11/11/2019  2:38 PM  Sign when Signing Visit  [facility] Consult    HPI:    24 y.o. year old female who I have been consulted by Provider, No Primary Care for evaluation of Consult ([facility]   by Dr. Ferrer.  She has had abdominal pain for years.  She feels this stems from a operation she had secondary to accident where she says she had bowel injury and bowel repair.  She describes her problems now is that she eats and she has pain in the pain and nausea.  She worked up extensively in [place] and here in the [place] by multiple providers and multiple GI doctors.  No source of her pain and pressure is been found.  The MRI enterography was just recently done which by report I have tests such as a gastric emptying leak which shows possibly some gastroparesis has also been positive.  Endoscopies have been negative and other studies have also been negative including MR enterography.  She discretely describes this as I eaten I have pain and I have the symptoms and issues immediately after eating.  She states that she is able to not vomit but has severe nausea.  She is had weight loss but has a weight gain she was down to 103 she is back up to  112 today.   I was asked by Dr. Rosario to evaluate her for possible surgical intervention.    Allergies:Patient has no known allergies.    History reviewed. No pertinent past medical history.    Past Surgical History:   Procedure Laterality Date   ? SMALL INTESTINE SURGERY  2002   ? TONSILLECTOMY AND ADENOIDECTOMY         CURRENT MEDS:  Current Outpatient Medications on File Prior to Visit   Medication Sig Dispense Refill   ? ALPRAZolam (XANAX) 0.25 MG tablet Take 0.25 mg by mouth.     ? cyproheptadine (PERIACTIN) 4 mg tablet Take 4 mg by mouth.     ? LACTOBACILLUS ACIDOPHILUS ORAL Take 1 capsule by mouth.     ? ondansetron (ZOFRAN-ODT) 4 MG disintegrating tablet Take 4 mg by mouth.       No current facility-administered medications on file prior to visit.        History reviewed. No pertinent family history.     reports that she has never smoked. She has never used smokeless tobacco. She reports current alcohol use. She reports that she does not use drugs.    Review of Systems:  Negative except abdominal pain nausea chronic and debilitating happens on a daily basis.  He has weight fluctuations secondary to it and severe symptoms of cause her so much trouble she like surgery for this possible to repair this.  If there is her surgical option.    OBJECTIVE:  Vitals:    11/11/19 1020   BP: 114/56   Patient Site: Right Arm   Patient Position: Sitting   Cuff Size: Adult Small   Pulse: 63   SpO2: 100%   Weight: 112 lb (50.8 kg)     There is no height or weight on file to calculate BMI.    EXAM:  GENERAL: This is a well-developed 24 y.o. female who appears her stated age  HEAD: normocephalic  HEENT: Pupils equal and reactive bilaterally  MOUTH: mucus membranes intact  CARDIAC: RRR without murmur  CHEST/LUNG:  Clear to auscultation  ABDOMEN: Soft, nontender, nondistended, no masses  Incision around her umbilicus both above and below.  No herniations are noted.  EXTREMITIES: Grossly normal, warm,   NEUROLOGIC: Focally  intact, nonfocal  INTEGUMENT: No open lesions or ulcers  VASCULAR: Pulses intact, symmetrical upper and lower extremities.        LABS:  No results found for: WBC, HGB, HCT, MCV, PLT  INR/Prothrombin Time      No results found for: HGBA1C  No results found for: ALT, AST, GGT, ALKPHOS, BILITOT     Images: Reviewed the MR [facility]ography [facility] the [facility] which is normal      Assessment/Plan:   She has chronic abdominal pain and nausea.  She associates this this with eating.  Her work-up is been endoscopy enterography by MRI gastric emptying study which was kind of positive for gastroparesis but kind of not also multiple GI providers have done multiple work-ups both here in the [facility] and in [place].  After evaluating her studies and discussing things with her I would like to rule out SMA syndrome first.  We did a CTA of her abdomen look at the angle of her SMA takeoff also look and see if she has dilatated duodenum proximal none of the studies described that and she probably does not but I want to rule that out.  NG feeds and weight gain conservative management for at least 6 to 8 weeks.  If this is negative at that time point I think her options are from my standpoint I could do a laparoscopy I do not think that the yield should be very high to be less than 2% risk of potential finding something may be amenable to making things better for her and/or exploration if need be.  This was discussed with her and we will order a CT scan of her abdomen with a CTA and follow-up with her once we get that study done.    No follow-ups on file.     Kota Reynoso MD  [facility] Department of Surgery

## 2021-08-04 ENCOUNTER — PATIENT OUTREACH (OUTPATIENT)
Dept: FAMILY MEDICINE | Facility: CLINIC | Age: 26
End: 2021-08-04

## 2021-08-04 NOTE — LETTER
August 4, 2021      Kimberlee Gabriel  [address] [facility] [address]        Dear alejandro,    This letter is to remind you that you are due for your follow-up Pap smear and Human Papillomavirus (HPV) test.    Please call [phone] to schedule your appointment at your earliest convenience.    If you have completed the appointment outside of the [facility] system, please have the records forwarded to our office. We will update your chart for your provider to review before your next annual wellness visit.     Thank you for choosing [facility]!      Sincerely,    Your [facility] Care Team

## 2021-10-10 ENCOUNTER — HEALTH MAINTENANCE LETTER (OUTPATIENT)
Age: 26
End: 2021-10-10

## 2022-05-22 ENCOUNTER — HEALTH MAINTENANCE LETTER (OUTPATIENT)
Age: 27
End: 2022-05-22

## 2022-09-18 ENCOUNTER — HEALTH MAINTENANCE LETTER (OUTPATIENT)
Age: 27
End: 2022-09-18

## 2023-06-04 ENCOUNTER — HEALTH MAINTENANCE LETTER (OUTPATIENT)
Age: 28
End: 2023-06-04

## 2024-07-14 ENCOUNTER — HEALTH MAINTENANCE LETTER (OUTPATIENT)
Age: 29
End: 2024-07-14